# Patient Record
Sex: FEMALE | Race: WHITE | NOT HISPANIC OR LATINO | URBAN - METROPOLITAN AREA
[De-identification: names, ages, dates, MRNs, and addresses within clinical notes are randomized per-mention and may not be internally consistent; named-entity substitution may affect disease eponyms.]

---

## 2023-03-31 ENCOUNTER — TELEPHONE (OUTPATIENT)
Dept: PRIMARY CARE | Facility: CLINIC | Age: 58
End: 2023-03-31
Payer: COMMERCIAL

## 2023-03-31 DIAGNOSIS — N30.00 ACUTE CYSTITIS WITHOUT HEMATURIA: Primary | ICD-10-CM

## 2023-03-31 RX ORDER — CIPROFLOXACIN 250 MG/1
250 TABLET, FILM COATED ORAL 2 TIMES DAILY
Qty: 10 TABLET | Refills: 0 | Status: SHIPPED | OUTPATIENT
Start: 2023-03-31 | End: 2023-04-05

## 2023-04-13 LAB
CHOLESTEROL (MG/DL) IN SER/PLAS: 161 MG/DL (ref 0–199)
CHOLESTEROL IN HDL (MG/DL) IN SER/PLAS: 61.4 MG/DL
CHOLESTEROL/HDL RATIO: 2.6
LDL: 71 MG/DL (ref 0–99)
TRIGLYCERIDE (MG/DL) IN SER/PLAS: 143 MG/DL (ref 0–149)
VLDL: 29 MG/DL (ref 0–40)

## 2023-04-25 ENCOUNTER — TELEPHONE (OUTPATIENT)
Dept: PRIMARY CARE | Facility: CLINIC | Age: 58
End: 2023-04-25
Payer: COMMERCIAL

## 2023-04-25 NOTE — TELEPHONE ENCOUNTER
Pt. Would like to know BW for lipid results and wants to know if she should continue taking her meds. That were prescribed.

## 2023-07-12 ENCOUNTER — TELEPHONE (OUTPATIENT)
Dept: PRIMARY CARE | Facility: CLINIC | Age: 58
End: 2023-07-12
Payer: COMMERCIAL

## 2023-07-12 NOTE — TELEPHONE ENCOUNTER
Patient calling for a referral for a herniated intestine its not bothering her daily and painful,    Also would like the results from her last blood work she never received them. Please advise

## 2023-07-13 ENCOUNTER — TELEPHONE (OUTPATIENT)
Dept: PRIMARY CARE | Facility: CLINIC | Age: 58
End: 2023-07-13
Payer: COMMERCIAL

## 2023-07-20 NOTE — TELEPHONE ENCOUNTER
Talked with pt who is seeing Dr Sherman tomorrow for hernia repair. I informed her of her lipid panel results (WNL) and she has an appt for Oct 18th for her physical (no appts avaiablein Sept).

## 2023-08-01 LAB
HEMATOCRIT (%) IN BLOOD BY AUTOMATED COUNT: 38.5 % (ref 36–46)
HEMOGLOBIN (G/DL) IN BLOOD: 12.6 G/DL (ref 12–16)

## 2023-08-03 ENCOUNTER — HOSPITAL ENCOUNTER (OUTPATIENT)
Dept: DATA CONVERSION | Facility: HOSPITAL | Age: 58
End: 2023-08-03
Attending: SURGERY | Admitting: SURGERY
Payer: COMMERCIAL

## 2023-08-03 DIAGNOSIS — K43.9 VENTRAL HERNIA WITHOUT OBSTRUCTION OR GANGRENE: ICD-10-CM

## 2023-08-07 LAB
ATRIAL RATE: 70 BPM
P AXIS: 43 DEGREES
P OFFSET: 201 MS
P ONSET: 163 MS
PR INTERVAL: 112 MS
Q ONSET: 219 MS
QRS COUNT: 12 BEATS
QRS DURATION: 74 MS
QT INTERVAL: 432 MS
QTC CALCULATION(BAZETT): 466 MS
QTC FREDERICIA: 455 MS
R AXIS: 47 DEGREES
T AXIS: 47 DEGREES
T OFFSET: 435 MS
VENTRICULAR RATE: 70 BPM

## 2023-08-08 ENCOUNTER — TELEMEDICINE (OUTPATIENT)
Dept: PRIMARY CARE | Facility: CLINIC | Age: 58
End: 2023-08-08
Payer: COMMERCIAL

## 2023-08-08 VITALS — WEIGHT: 162 LBS | HEIGHT: 64 IN | BODY MASS INDEX: 27.66 KG/M2

## 2023-08-08 DIAGNOSIS — Z87.19 H/O HERNIA REPAIR: ICD-10-CM

## 2023-08-08 DIAGNOSIS — Z98.890 H/O HERNIA REPAIR: ICD-10-CM

## 2023-08-08 DIAGNOSIS — N39.0 ACUTE UTI: Primary | ICD-10-CM

## 2023-08-08 PROBLEM — J30.2 SEASONAL ALLERGIES: Status: ACTIVE | Noted: 2023-08-08

## 2023-08-08 PROBLEM — K42.9 UMBILICAL HERNIA: Status: ACTIVE | Noted: 2023-08-08

## 2023-08-08 PROBLEM — K21.9 GERD (GASTROESOPHAGEAL REFLUX DISEASE): Status: ACTIVE | Noted: 2023-08-08

## 2023-08-08 PROBLEM — E66.9 OBESITY: Status: ACTIVE | Noted: 2023-08-08

## 2023-08-08 PROBLEM — K58.9 IBS (IRRITABLE BOWEL SYNDROME): Status: ACTIVE | Noted: 2023-08-08

## 2023-08-08 PROBLEM — E78.5 HYPERLIPIDEMIA: Status: ACTIVE | Noted: 2023-08-08

## 2023-08-08 PROBLEM — G47.33 OBSTRUCTIVE SLEEP APNEA, ADULT: Status: ACTIVE | Noted: 2023-08-08

## 2023-08-08 PROBLEM — F41.9 ANXIETY: Status: ACTIVE | Noted: 2023-08-08

## 2023-08-08 PROCEDURE — 99213 OFFICE O/P EST LOW 20 MIN: CPT | Performed by: STUDENT IN AN ORGANIZED HEALTH CARE EDUCATION/TRAINING PROGRAM

## 2023-08-08 RX ORDER — CITALOPRAM 40 MG/1
40 TABLET, FILM COATED ORAL
Qty: 90 TABLET | Refills: 2 | Status: CANCELLED | OUTPATIENT
Start: 2023-08-08

## 2023-08-08 RX ORDER — GABAPENTIN 300 MG/1
CAPSULE ORAL
COMMUNITY
Start: 2023-08-03 | End: 2023-10-18 | Stop reason: ALTCHOICE

## 2023-08-08 RX ORDER — NITROFURANTOIN 25; 75 MG/1; MG/1
100 CAPSULE ORAL 2 TIMES DAILY
Qty: 14 CAPSULE | Refills: 0 | Status: SHIPPED | OUTPATIENT
Start: 2023-08-08 | End: 2023-08-15

## 2023-08-08 RX ORDER — ATORVASTATIN CALCIUM 10 MG/1
10 TABLET, FILM COATED ORAL NIGHTLY
COMMUNITY
End: 2023-10-18 | Stop reason: ALTCHOICE

## 2023-08-08 RX ORDER — PHENTERMINE HYDROCHLORIDE 37.5 MG/1
37.5 CAPSULE ORAL DAILY
COMMUNITY
Start: 2023-06-14 | End: 2023-10-18 | Stop reason: ALTCHOICE

## 2023-08-08 RX ORDER — CITALOPRAM 40 MG/1
40 TABLET, FILM COATED ORAL
COMMUNITY
End: 2023-10-18 | Stop reason: SDUPTHER

## 2023-08-08 NOTE — PROGRESS NOTES
"Subjective   Patient ID: Janette Mills is a 58 y.o. female who presents for the following    Assessment/Plan   #Acute Uncomplicated Cystitis   #Recent Hernia Repair Surgery (DOS 8/3/2023); recovering    PLAN  - Macrobid 100mg BID x 7 days   - As patient symptoms are consistent with UTI, no need for UA or Ucx at this time.   - Patient advised to watch for new fevers, chills, worsening abdominal pain, and flank pain. If these occur patient is to either call office or go to ED for further care .  - Oral hydration encouraged    Pt to follow up with PCP for primary care at next scheduled appointment    HPI    58F presents for virtual visit. Attempt was made to connect with video conferencing software, but due to technical difficulties, patient was unable to connect. Visit will be completed via phone call.     She is currently complaining of increased urinary frequency and dysuria. She has mild suprapubic pain that is 3/10 in severity and non-radiating. Denies any lightheadedness, dizziness, flank pain, fevers, chills, nausea, vomiting, diarrhea, vaginal discharge/bleeding or new numbness/weakness/tingling. Patient had a intestinal hernia repair on August 3, 2023 and she is recovering well at this time. Currently she is taking tylenol and ibuprofen for pain relief.     Post-Menopausal    Social  T: denies  A: denies  D: denies         Visit Vitals  Ht 1.626 m (5' 4\")   Wt 73.5 kg (162 lb)   BMI 27.81 kg/m²   Smoking Status Never   BSA 1.82 m²     PHYSICAL EXAM     Exam not performed due to virtual visit.   REVIEW OF SYSTEMS     ROS reviewed in HPI  No Known Allergies    Current Outpatient Medications   Medication Sig Dispense Refill    citalopram (CeleXA) 40 mg tablet Take 1 tablet (40 mg) by mouth once daily.      phentermine 37.5 mg capsule Take 1 capsule (37.5 mg) by mouth once daily.       No current facility-administered medications for this visit.       Objective     Orders Only on 08/01/2023   Component Date " Value Ref Range Status    Hemoglobin 08/01/2023 12.6  12.0 - 16.0 g/dL Final    Hematocrit 08/01/2023 38.5  36.0 - 46.0 % Final   Orders Only on 04/13/2023   Component Date Value Ref Range Status    Cholesterol 04/13/2023 161  0 - 199 mg/dL Final    HDL 04/13/2023 61.4  mg/dL Final    Cholesterol/HDL Ratio 04/13/2023 2.6   Final    LDL 04/13/2023 71  0 - 99 mg/dL Final    VLDL 04/13/2023 29  0 - 40 mg/dL Final    Triglycerides 04/13/2023 143  0 - 149 mg/dL Final       Radiology: Reviewed imaging in powerchart.  Electrocardiogram 12 Lead    Result Date: 8/7/2023  Normal sinus rhythm Low voltage QRS, consider pulmonary disease, pericardial effusion, or normal variant Borderline ECG No previous ECGs available Confirmed by Zeb Morales ([id number]) on 8/7/2023 9:44:52 AM      No family history on file.  Social History     Socioeconomic History    Marital status: Unknown     Spouse name: None    Number of children: None    Years of education: None    Highest education level: None   Occupational History    None   Tobacco Use    Smoking status: Never    Smokeless tobacco: Never   Substance and Sexual Activity    Alcohol use: Yes    Drug use: None    Sexual activity: None   Other Topics Concern    None   Social History Narrative    None     Social Determinants of Health     Financial Resource Strain: Not on file   Food Insecurity: Not on file   Transportation Needs: Not on file   Physical Activity: Not on file   Stress: Not on file   Social Connections: Not on file   Intimate Partner Violence: Not on file   Housing Stability: Not on file     Past Medical History:   Diagnosis Date    Personal history of other diseases of the nervous system and sense organs     History of earache     Past Surgical History:   Procedure Laterality Date    CHOLECYSTECTOMY  02/07/2018    Cholecystectomy       Charting was completed using voice recognition technology and may include unintended errors.

## 2023-08-14 LAB
COMPLETE PATHOLOGY REPORT: NORMAL
CONVERTED CLINICAL DIAGNOSIS-HISTORY: NORMAL
CONVERTED FINAL DIAGNOSIS: NORMAL
CONVERTED FINAL REPORT PDF LINK TO COPY AND PASTE: NORMAL
CONVERTED GROSS DESCRIPTION: NORMAL

## 2023-09-29 VITALS — WEIGHT: 164.24 LBS | BODY MASS INDEX: 28.04 KG/M2 | HEIGHT: 64 IN

## 2023-09-30 NOTE — H&P
"    History & Physical Reviewed:   Pregnant/Lactating:  ·  Are You Pregnant no (1)   ·  Are You Currently Breastfeeding no (1)     I have reviewed the History and Physical dated:  21-Jul-2023   History and Physical reviewed and relevant findings noted. Patient examined to review pertinent physical  findings.: No significant changes   Home Medications Reviewed: no changes noted   Allergies Reviewed: no changes noted       ERAS (Enhanced Recovery After Surgery):  ·  ERAS Patient: no     Consent:   COVID-19 Consent:  ·  COVID-19 Risk Consent Surgeon has reviewed key risks related to the risk of aranza COVID-19 and if they contract COVID-19 what the risks are.       Electronic Signatures:  Rai Sherman)  (Signed 03-Aug-2023 12:26)   Authored: History & Physical Reviewed, ERAS, Consent,  Note Completion      Last Updated: 03-Aug-2023 12:26 by Rai Sherman (MD)    References:  1.  Data Referenced From \"Patient Profile - Preop v3\" 02-Aug-2023 11:33   "

## 2023-10-01 NOTE — OP NOTE
Post Operative Note:     Post-Procedure Diagnosis: Anterior abdominal wall  hernia   Procedure: 1.  Abdominal wall herniorrhaphy  2.   3.   4.   5.   Surgeon: Dr. Sherman   Resident/Fellow/Other Assistant: None   Estimated Blood Loss (mL): none   Specimen: yes   Findings: As above     Attestation:   Note Completion:  Attending Attestation I performed the procedure without a resident         Electronic Signatures:  Rai Sherman)  (Signed 03-Aug-2023 13:15)   Authored: Post Operative Note, Note Completion      Last Updated: 03-Aug-2023 13:15 by Rai Sherman)

## 2023-10-18 ENCOUNTER — LAB (OUTPATIENT)
Dept: LAB | Facility: LAB | Age: 58
End: 2023-10-18
Payer: COMMERCIAL

## 2023-10-18 ENCOUNTER — OFFICE VISIT (OUTPATIENT)
Dept: PRIMARY CARE | Facility: CLINIC | Age: 58
End: 2023-10-18
Payer: COMMERCIAL

## 2023-10-18 VITALS
WEIGHT: 166.4 LBS | BODY MASS INDEX: 28.41 KG/M2 | HEART RATE: 70 BPM | HEIGHT: 64 IN | OXYGEN SATURATION: 98 % | TEMPERATURE: 96.6 F | SYSTOLIC BLOOD PRESSURE: 126 MMHG | DIASTOLIC BLOOD PRESSURE: 88 MMHG

## 2023-10-18 DIAGNOSIS — Z00.00 ANNUAL PHYSICAL EXAM: Primary | ICD-10-CM

## 2023-10-18 DIAGNOSIS — F41.9 ANXIETY: ICD-10-CM

## 2023-10-18 DIAGNOSIS — Z00.00 ANNUAL PHYSICAL EXAM: ICD-10-CM

## 2023-10-18 LAB
25(OH)D3 SERPL-MCNC: 35 NG/ML (ref 30–100)
ALBUMIN SERPL BCP-MCNC: 4.5 G/DL (ref 3.4–5)
ALP SERPL-CCNC: 69 U/L (ref 33–110)
ALT SERPL W P-5'-P-CCNC: 12 U/L (ref 7–45)
ANION GAP SERPL CALC-SCNC: 14 MMOL/L (ref 10–20)
AST SERPL W P-5'-P-CCNC: 14 U/L (ref 9–39)
BILIRUB SERPL-MCNC: 0.9 MG/DL (ref 0–1.2)
BUN SERPL-MCNC: 8 MG/DL (ref 6–23)
CALCIUM SERPL-MCNC: 9.8 MG/DL (ref 8.6–10.6)
CHLORIDE SERPL-SCNC: 102 MMOL/L (ref 98–107)
CHOLEST SERPL-MCNC: 253 MG/DL (ref 0–199)
CHOLESTEROL/HDL RATIO: 4.2
CO2 SERPL-SCNC: 30 MMOL/L (ref 21–32)
CREAT SERPL-MCNC: 0.63 MG/DL (ref 0.5–1.05)
ERYTHROCYTE [DISTWIDTH] IN BLOOD BY AUTOMATED COUNT: 12.7 % (ref 11.5–14.5)
EST. AVERAGE GLUCOSE BLD GHB EST-MCNC: 108 MG/DL
GFR SERPL CREATININE-BSD FRML MDRD: >90 ML/MIN/1.73M*2
GLUCOSE SERPL-MCNC: 90 MG/DL (ref 74–99)
HBA1C MFR BLD: 5.4 %
HCT VFR BLD AUTO: 44.2 % (ref 36–46)
HDLC SERPL-MCNC: 60 MG/DL
HGB BLD-MCNC: 14 G/DL (ref 12–16)
LDLC SERPL CALC-MCNC: 145 MG/DL
MCH RBC QN AUTO: 30.7 PG (ref 26–34)
MCHC RBC AUTO-ENTMCNC: 31.7 G/DL (ref 32–36)
MCV RBC AUTO: 97 FL (ref 80–100)
NON HDL CHOLESTEROL: 193 MG/DL (ref 0–149)
NRBC BLD-RTO: 0 /100 WBCS (ref 0–0)
PLATELET # BLD AUTO: 295 X10*3/UL (ref 150–450)
PMV BLD AUTO: 10.4 FL (ref 7.5–11.5)
POTASSIUM SERPL-SCNC: 4.1 MMOL/L (ref 3.5–5.3)
PROT SERPL-MCNC: 7.1 G/DL (ref 6.4–8.2)
RBC # BLD AUTO: 4.56 X10*6/UL (ref 4–5.2)
SODIUM SERPL-SCNC: 142 MMOL/L (ref 136–145)
TRIGL SERPL-MCNC: 239 MG/DL (ref 0–149)
TSH SERPL-ACNC: 1.44 MIU/L (ref 0.44–3.98)
VLDL: 48 MG/DL (ref 0–40)
WBC # BLD AUTO: 5 X10*3/UL (ref 4.4–11.3)

## 2023-10-18 PROCEDURE — 84443 ASSAY THYROID STIM HORMONE: CPT

## 2023-10-18 PROCEDURE — 36415 COLL VENOUS BLD VENIPUNCTURE: CPT

## 2023-10-18 PROCEDURE — 99396 PREV VISIT EST AGE 40-64: CPT | Performed by: INTERNAL MEDICINE

## 2023-10-18 PROCEDURE — 80061 LIPID PANEL: CPT

## 2023-10-18 PROCEDURE — 85027 COMPLETE CBC AUTOMATED: CPT

## 2023-10-18 PROCEDURE — 82306 VITAMIN D 25 HYDROXY: CPT

## 2023-10-18 PROCEDURE — 83036 HEMOGLOBIN GLYCOSYLATED A1C: CPT

## 2023-10-18 PROCEDURE — 80053 COMPREHEN METABOLIC PANEL: CPT

## 2023-10-18 PROCEDURE — 1036F TOBACCO NON-USER: CPT | Performed by: INTERNAL MEDICINE

## 2023-10-18 PROCEDURE — 93000 ELECTROCARDIOGRAM COMPLETE: CPT | Performed by: INTERNAL MEDICINE

## 2023-10-18 RX ORDER — CITALOPRAM 40 MG/1
40 TABLET, FILM COATED ORAL
Qty: 90 TABLET | Refills: 3 | Status: SHIPPED | OUTPATIENT
Start: 2023-10-18

## 2023-10-18 ASSESSMENT — VISUAL ACUITY
OS_CC: 20/20
OD_CC: 20/15

## 2023-10-18 NOTE — LETTER
October 18, 2023     Patient: Janette Mills   YOB: 1965   Date of Visit: 10/18/2023       To Whom It May Concern:    Janette Mills was seen in my clinic on 10/18/2023 at 8:15 am for a Wellness exam.    If you have any questions or concerns, please don't hesitate to call.         Sincerely,         Dennis Osborne, DO

## 2023-10-18 NOTE — PROGRESS NOTES
"Subjective   Patient ID: Janette Mills is a 58 y.o. female who presents for the following    Assessment/Plan    Colorguard,  8/28/2023 negative      Anxiety: Refill citalopram     CBC, CMP, lipid panel, a1c, tsh, vitamin d     follow up with gyn      mammogram order given     HPI  Female obstructive sleep apnea comes today for physical exam      anxiety controlled on citalopram. she says that her  causes her a lot of anxiety as he has a significant drinking problem. patient's  continues to sleep and drink ETOH. She did leave her house and  as of December 26, 2023 and lives in her own apartment . She is still trying to get a divorce. Very difficult time currently. She has adult children.      denies smoking  occasional alcohol   denies illegal drugs     denies any family medical problems      surgical history: umbilical hernia 9/2023 Dr Sherman     patient goes to GYN and gets regular gyn and mammogram orders filled.   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        Visit Vitals  /88 (BP Location: Right arm, Patient Position: Sitting)   Pulse 70   Temp 35.9 °C (96.6 °F)   Ht 1.626 m (5' 4\")   Wt 75.5 kg (166 lb 6.4 oz)   SpO2 98%   BMI 28.56 kg/m²   Smoking Status Never   BSA 1.85 m²     PHYSICAL EXAM   General appearance: Alert and in no acute distress. speech is clear and coherent  HEENT: Sclera and conjunctiva white, EOMI, uvela midline, no mouth lesions. PERRLA,  nasal turbinates are not swollen without exudate. TM's Wagoner with cone of light, external ear canal with scant cerumen. No head trauma  Neck: no carotid bruits or thyromegaly. no lymphadenopathy   Respiratory : No respiratory distress, normal respiratory rhythm and effort. Clear bilateral breath sounds. No wheezing or rhonchi.   Cardiovascular: heart rate regular, S1, S2. no murmurs. no Lower extremity edema  Skin inspection: Normal skin color and pigmentation, normal " skin turgor and no visible rash, induration, or cellulitis  MSK: 5/5 strength upper and lower extremities without gait abnormalities. no loss of muscle mass   Neuro: 2-12 CN grossly intact.  no slurred speech. no lateralizing deficit  Psychiatric Orientation: Oriented to person, place, and time. no depression, homicidal or suicidal thoughts, normal affect  Abdomen: soft, none tender, none distended. no organomegaly      REVIEW OF SYSTEMS     Constitutional: not feeling tired and no fever, chills or sweats. Denies weight loss    HEENT: no earache and no sore throat. no blurred vision and or double vision. no headache  Cardiovascular: no exertional chest pain, no palpitations, no lower extremity edema and no intermittent leg claudication.   Lungs: Denies shortness of breath, exertional dyspnea, wheezing  Gastrointestinal: no change in bowel habits, no diarrhea, no nausea, no vomiting and no abdominal pain. Denies Melena, brbpr or dark stool  Musculoskeletal: no myalgias, no muscle weakness and no limb swelling.   Skin: no rashes, no change in skin color and pigmentation and no skin lumps.   Neurological: no headaches, no seizures, no numbness, no lateralizing deficits and no fainting.   Psychiatric: no depression and no anxiety.   Urine: denies polyuria, hematuria, dysuria   Endocrine: no cold intolerance, no heat intolerance    No Known Allergies    Current Outpatient Medications   Medication Sig Dispense Refill    citalopram (CeleXA) 40 mg tablet Take 1 tablet (40 mg) by mouth once daily.       No current facility-administered medications for this visit.       Objective     Orders Only on 08/01/2023   Component Date Value Ref Range Status    Hemoglobin 08/01/2023 12.6  12.0 - 16.0 g/dL Final    Hematocrit 08/01/2023 38.5  36.0 - 46.0 % Final       Radiology: Reviewed imaging in powerchart.  No results found.    No family history on file.  Social History     Socioeconomic History    Marital status: Unknown     Spouse  name: Not on file    Number of children: Not on file    Years of education: Not on file    Highest education level: Not on file   Occupational History    Not on file   Tobacco Use    Smoking status: Never    Smokeless tobacco: Never   Substance and Sexual Activity    Alcohol use: Yes     Comment: occassionally    Drug use: Not on file    Sexual activity: Not on file   Other Topics Concern    Not on file   Social History Narrative    Not on file     Social Determinants of Health     Financial Resource Strain: Not on file   Food Insecurity: Not on file   Transportation Needs: Not on file   Physical Activity: Not on file   Stress: Not on file   Social Connections: Not on file   Intimate Partner Violence: Not on file   Housing Stability: Not on file     Past Medical History:   Diagnosis Date    Personal history of other diseases of the nervous system and sense organs     History of earache     Past Surgical History:   Procedure Laterality Date    CHOLECYSTECTOMY  02/07/2018    Cholecystectomy       Charting was completed using voice recognition technology and may include unintended errors.

## 2023-10-20 ENCOUNTER — TELEPHONE (OUTPATIENT)
Dept: PRIMARY CARE | Facility: CLINIC | Age: 58
End: 2023-10-20
Payer: COMMERCIAL

## 2023-10-20 DIAGNOSIS — E78.5 HYPERLIPIDEMIA, UNSPECIFIED HYPERLIPIDEMIA TYPE: Primary | ICD-10-CM

## 2023-10-20 DIAGNOSIS — E78.5 HYPERLIPIDEMIA, UNSPECIFIED HYPERLIPIDEMIA TYPE: ICD-10-CM

## 2023-10-20 RX ORDER — ROSUVASTATIN CALCIUM 5 MG/1
5 TABLET, COATED ORAL DAILY
Qty: 90 TABLET | Refills: 1 | Status: SHIPPED | OUTPATIENT
Start: 2023-10-20 | End: 2023-10-20 | Stop reason: SDUPTHER

## 2023-10-20 RX ORDER — ROSUVASTATIN CALCIUM 5 MG/1
5 TABLET, COATED ORAL DAILY
Qty: 90 TABLET | Refills: 1 | Status: SHIPPED | OUTPATIENT
Start: 2023-10-20 | End: 2024-05-01

## 2023-10-20 NOTE — TELEPHONE ENCOUNTER
Pt called to switch pharmacies and educated on Crestor/labs-she is taking in mostly vegetarian diet-educated that it could be genetics and she agreed to take the medication

## 2023-10-20 NOTE — TELEPHONE ENCOUNTER
----- Message from Dennis Osborne DO sent at 10/20/2023 11:26 AM EDT -----  LDL could use some reduction as this is your bad cholesterol. I did call in crestor for patient, unless she would like to do this on her own and eat more vegetable based diet. Follow up in 3-4 months and fast at that time.

## 2023-12-15 ENCOUNTER — TELEPHONE (OUTPATIENT)
Dept: PRIMARY CARE | Facility: CLINIC | Age: 58
End: 2023-12-15
Payer: COMMERCIAL

## 2023-12-18 DIAGNOSIS — N30.00 ACUTE CYSTITIS WITHOUT HEMATURIA: Primary | ICD-10-CM

## 2023-12-18 RX ORDER — NITROFURANTOIN (MACROCRYSTALS) 100 MG/1
100 CAPSULE ORAL 2 TIMES DAILY
Qty: 10 CAPSULE | Refills: 0 | Status: SHIPPED | OUTPATIENT
Start: 2023-12-18 | End: 2023-12-28

## 2024-01-03 ENCOUNTER — TELEMEDICINE (OUTPATIENT)
Dept: PRIMARY CARE | Facility: CLINIC | Age: 59
End: 2024-01-03
Payer: COMMERCIAL

## 2024-01-03 VITALS — WEIGHT: 160 LBS | BODY MASS INDEX: 27.46 KG/M2

## 2024-01-03 DIAGNOSIS — A49.9 BACTERIAL INFECTION: Primary | ICD-10-CM

## 2024-01-03 PROCEDURE — 99213 OFFICE O/P EST LOW 20 MIN: CPT | Performed by: EMERGENCY MEDICINE

## 2024-01-03 RX ORDER — CIPROFLOXACIN 500 MG/1
500 TABLET ORAL 2 TIMES DAILY
Qty: 20 TABLET | Refills: 0 | Status: SHIPPED | OUTPATIENT
Start: 2024-01-03 | End: 2024-01-13

## 2024-01-03 NOTE — PROGRESS NOTES
Subjective   Patient ID: Janette Mills is a 58 y.o. female who presents for No chief complaint on file..    Assessment/Plan   Problem List Items Addressed This Visit    None  Visit Diagnoses       Bacterial infection    -  Primary    Relevant Medications    ciprofloxacin (Cipro) 500 mg tablet          Dysuria- Check UA and culture. Will treat empirically with cipro.     Anxiety- continue celexa 40mg     Hyperlipidemia- crestor 5mg     Follow up as needed     Source of history: Nurse, Medical personnel, Medical record, Patient.  History limitation: None.    HPI  58 y.o. female here for virtual visit     This visit was completed virtually due to the restrictions of the COVID-19 pandemic. All issues as below were discussed and addressed but no physical exam was performed. If it was felt that the patient should be evaluated in clinic or ER, then they were directed there. The patient verbally consented to visit      Presents with pain and burning with urination and increased urinary frequency. About 2 weeks ago was treated with 5 days of  nitrofurantoin, symptoms quickly returned.     No Known Allergies    Current Outpatient Medications   Medication Sig Dispense Refill    ciprofloxacin (Cipro) 500 mg tablet Take 1 tablet (500 mg) by mouth 2 times a day for 10 days. 20 tablet 0    citalopram (CeleXA) 40 mg tablet Take 1 tablet (40 mg) by mouth once daily. 90 tablet 3    rosuvastatin (Crestor) 5 mg tablet Take 1 tablet (5 mg) by mouth once daily. 90 tablet 1     No current facility-administered medications for this visit.       Objective   Visit Vitals  Wt 72.6 kg (160 lb)   BMI 27.46 kg/m²   Smoking Status Never   BSA 1.81 m²     Physical Exam  Patient was not physically examined.     Review of Systems   Comprehensive review of systems as allowed by patient condition and nursing input is negative    Lab on 10/18/2023   Component Date Value Ref Range Status    WBC 10/18/2023 5.0  4.4 - 11.3 x10*3/uL Final    nRBC  10/18/2023 0.0  0.0 - 0.0 /100 WBCs Final    RBC 10/18/2023 4.56  4.00 - 5.20 x10*6/uL Final    Hemoglobin 10/18/2023 14.0  12.0 - 16.0 g/dL Final    Hematocrit 10/18/2023 44.2  36.0 - 46.0 % Final    MCV 10/18/2023 97  80 - 100 fL Final    MCH 10/18/2023 30.7  26.0 - 34.0 pg Final    MCHC 10/18/2023 31.7 (L)  32.0 - 36.0 g/dL Final    RDW 10/18/2023 12.7  11.5 - 14.5 % Final    Platelets 10/18/2023 295  150 - 450 x10*3/uL Final    MPV 10/18/2023 10.4  7.5 - 11.5 fL Final    Glucose 10/18/2023 90  74 - 99 mg/dL Final    Sodium 10/18/2023 142  136 - 145 mmol/L Final    Potassium 10/18/2023 4.1  3.5 - 5.3 mmol/L Final    Chloride 10/18/2023 102  98 - 107 mmol/L Final    Bicarbonate 10/18/2023 30  21 - 32 mmol/L Final    Anion Gap 10/18/2023 14  10 - 20 mmol/L Final    Urea Nitrogen 10/18/2023 8  6 - 23 mg/dL Final    Creatinine 10/18/2023 0.63  0.50 - 1.05 mg/dL Final    eGFR 10/18/2023 >90  >60 mL/min/1.73m*2 Final    Calcium 10/18/2023 9.8  8.6 - 10.6 mg/dL Final    Albumin 10/18/2023 4.5  3.4 - 5.0 g/dL Final    Alkaline Phosphatase 10/18/2023 69  33 - 110 U/L Final    Total Protein 10/18/2023 7.1  6.4 - 8.2 g/dL Final    AST 10/18/2023 14  9 - 39 U/L Final    Bilirubin, Total 10/18/2023 0.9  0.0 - 1.2 mg/dL Final    ALT 10/18/2023 12  7 - 45 U/L Final    Hemoglobin A1C 10/18/2023 5.4  see below % Final    Estimated Average Glucose 10/18/2023 108  Not Established mg/dL Final    Cholesterol 10/18/2023 253 (H)  0 - 199 mg/dL Final    HDL-Cholesterol 10/18/2023 60.0  mg/dL Final    Cholesterol/HDL Ratio 10/18/2023 4.2   Final    LDL Calculated 10/18/2023 145 (H)  <=99 mg/dL Final    VLDL 10/18/2023 48 (H)  0 - 40 mg/dL Final    Triglycerides 10/18/2023 239 (H)  0 - 149 mg/dL Final    Non HDL Cholesterol 10/18/2023 193 (H)  0 - 149 mg/dL Final    Thyroid Stimulating Hormone 10/18/2023 1.44  0.44 - 3.98 mIU/L Final    Vitamin D, 25-Hydroxy, Total 10/18/2023 35  30 - 100 ng/mL Final       Radiology: Reviewed imaging in  powerchart.  BI mammo bilateral screening tomosynthesis    Result Date: 12/1/2023  SCREENING BILATERAL DIGITAL MAMMOGRAM WITH TOMOSYNTHESIS. Clinical Data: Screening. Comparison: 10/5/2022, 10/6/2020, 8/24/2020, 6/8/2019, 1/6/2018, 7/7/2016, 6/26/2014, 6/23/2014. Mammographic findings: Standard 2D and tomosynthesis images were reviewed at 1 mm slice thickness. The breasts are heterogeneously dense, which may obscure small masses. No suspicious masses or suspicious calcifications are identified in either breast. There are stable benign-appearing grouped calcifications in the upper-outer quadrant of the left breast at mid depth. The digital mammogram has been reviewed with the aid of CAD. IMPRESSION AND RECOMMENDATION: No mammographic evidence of malignancy. Recommendation is for the patient to return in one year for annual mammogram. Category 2: Benign finding YOUR MAMMOGRAM DEMONSTRATES THAT YOU HAVE DENSE BREAST TISSUE, WHICH COULD HIDE ABNORMALITIES. DENSE BREAST TISSUE, IN AND OF ITSELF, IS A RELATIVELY COMMON CONDITION. THEREFORE, THIS INFORMATION IS NOT PROVIDED TO CAUSE UNDUE CONCERN; RATHER, IT IS TO RAISE YOUR AWARENESS AND PROMOTE DISCUSSION WITH YOUR HEALTHCARE PROVIDER REGARDING THE PRESENCE OF DENSE BREAST TISSUE IN ADDITION TO OTHER RISK FACTORS. Electronically signed by:  Lavelle Cowan MD  12/13/2023 10:50 AM EST Workstation: [id number] Technologist:  MS Dictated By:   LAVELLE COWAN MD Signed By:     LAVELLE COWAN MD Signed Out:    12/13/23 10:54:30      No family history on file.  Social History     Socioeconomic History    Marital status: Unknown     Spouse name: Not on file    Number of children: Not on file    Years of education: Not on file    Highest education level: Not on file   Occupational History    Not on file   Tobacco Use    Smoking status: Never    Smokeless tobacco: Never   Substance and Sexual Activity    Alcohol use: Yes     Comment: occassionally    Drug use: Not on file    Sexual  activity: Not on file   Other Topics Concern    Not on file   Social History Narrative    Not on file     Social Determinants of Health     Financial Resource Strain: Not on file   Food Insecurity: Not on file   Transportation Needs: Not on file   Physical Activity: Not on file   Stress: Not on file   Social Connections: Not on file   Intimate Partner Violence: Not on file   Housing Stability: Not on file     Past Medical History:   Diagnosis Date    Personal history of other diseases of the nervous system and sense organs     History of earache     Past Surgical History:   Procedure Laterality Date    CHOLECYSTECTOMY  02/07/2018    Cholecystectomy       Scribe Attestation  By signing my name below, IShiela Scribe   attest that this documentation has been prepared under the direction and in the presence of Sae Glasgow MD.

## 2024-05-01 DIAGNOSIS — E78.5 HYPERLIPIDEMIA, UNSPECIFIED HYPERLIPIDEMIA TYPE: ICD-10-CM

## 2024-05-01 RX ORDER — ROSUVASTATIN CALCIUM 5 MG/1
5 TABLET, COATED ORAL DAILY
Qty: 90 TABLET | Refills: 3 | Status: SHIPPED | OUTPATIENT
Start: 2024-05-01

## 2024-05-06 NOTE — OP NOTE
SURGEON:  Rai Sherman MD    PREOPERATIVE DIAGNOSIS:    Anterior abdominal wall hernia.    POSTOPERATIVE DIAGNOSIS:    Anterior abdominal wall hernia.    PROCEDURE:    Repair of anterior abdominal wall hernia.    ANESTHESIA:    MAC.    INDICATIONS:    The patient is a 58-year-old female with a symptomatic supraumbilical  midline abdominal wall hernia.     DESCRIPTION OF PROCEDURE:    Following informed consent, the patient was taken to the operating  room, placed in supine position.  Huddle was performed.  She was  given a MAC anesthetic.  Sequential compression devices were in place  and functioning.  She received Ancef IV in the operating room.  Her  abdomen was prepped and draped in sterile fashion.  A time-out was  performed.  A supraumbilical midline skin incision was made.  Approximately 5 cm superior to the umbilicus in the midline, the  patient had a hernia.  The hernia sac was freed from surrounding  tissue and excised.  Fatty tissue from within the hernia sac was  excised, which appeared to be preperitoneal fat.  The hernia sac was  closed with a running 3-0 Vicryl suture.  The fascial defect was  approximately 2 to 2.5 cm in width, but only about 0.5 cm in length.  The fascia was closed in a transverse direction with interrupted 0  Prolene sutures.  There was no tension on the closure.  Mesh was not  placed.  Wound was irrigated with saline.  0.5% plain Marcaine was  infiltrated.  The wound was closed in layers with interrupted 3-0  Vicryl subcutaneous sutures and a running 4-0 Vicryl subcuticular  skin stitch.  A dressing was placed.  The patient taken to the  recovery room in satisfactory condition, having tolerated procedure  well.  Counts were correct.       Rai Sherman MD    DD:  08/03/2023 13:13:50 EST  DT:  08/03/2023 23:20:39 EST  DICTATION NUMBER:  [id number]  INTERNAL JOB NUMBER:  [id number]      cc:             Dennis Osborne DO          Electronic Signatures:  Rai Sherman  (MD) (Signed on 04-Aug-2023 07:18)   Authored  Unsigned, Draft (SYS GENERATED) (Entered on 03-Aug-2023 23:20)   Entered    Last Updated: 04-Aug-2023 07:18 by Rai Sherman)

## 2024-11-18 ENCOUNTER — LAB (OUTPATIENT)
Dept: LAB | Facility: LAB | Age: 59
End: 2024-11-18
Payer: COMMERCIAL

## 2024-11-18 ENCOUNTER — APPOINTMENT (OUTPATIENT)
Dept: PRIMARY CARE | Facility: CLINIC | Age: 59
End: 2024-11-18
Payer: COMMERCIAL

## 2024-11-18 VITALS
BODY MASS INDEX: 30.05 KG/M2 | HEIGHT: 64 IN | DIASTOLIC BLOOD PRESSURE: 70 MMHG | WEIGHT: 176 LBS | SYSTOLIC BLOOD PRESSURE: 128 MMHG | OXYGEN SATURATION: 98 % | HEART RATE: 67 BPM

## 2024-11-18 DIAGNOSIS — F41.9 ANXIETY: ICD-10-CM

## 2024-11-18 DIAGNOSIS — E78.5 HYPERLIPIDEMIA, UNSPECIFIED HYPERLIPIDEMIA TYPE: ICD-10-CM

## 2024-11-18 DIAGNOSIS — Z00.00 ANNUAL PHYSICAL EXAM: ICD-10-CM

## 2024-11-18 DIAGNOSIS — Z00.00 ANNUAL PHYSICAL EXAM: Primary | ICD-10-CM

## 2024-11-18 LAB
25(OH)D3 SERPL-MCNC: 36 NG/ML (ref 30–100)
ALBUMIN SERPL BCP-MCNC: 4.4 G/DL (ref 3.4–5)
ALP SERPL-CCNC: 54 U/L (ref 33–110)
ALT SERPL W P-5'-P-CCNC: 12 U/L (ref 7–45)
ANION GAP SERPL CALC-SCNC: 11 MMOL/L (ref 10–20)
AST SERPL W P-5'-P-CCNC: 15 U/L (ref 9–39)
BILIRUB SERPL-MCNC: 1 MG/DL (ref 0–1.2)
BUN SERPL-MCNC: 12 MG/DL (ref 6–23)
CALCIUM SERPL-MCNC: 9.1 MG/DL (ref 8.6–10.6)
CHLORIDE SERPL-SCNC: 104 MMOL/L (ref 98–107)
CHOLEST SERPL-MCNC: 185 MG/DL (ref 0–199)
CHOLESTEROL/HDL RATIO: 3.2
CO2 SERPL-SCNC: 32 MMOL/L (ref 21–32)
CREAT SERPL-MCNC: 0.64 MG/DL (ref 0.5–1.05)
EGFRCR SERPLBLD CKD-EPI 2021: >90 ML/MIN/1.73M*2
ERYTHROCYTE [DISTWIDTH] IN BLOOD BY AUTOMATED COUNT: 12.1 % (ref 11.5–14.5)
EST. AVERAGE GLUCOSE BLD GHB EST-MCNC: 103 MG/DL
GLUCOSE SERPL-MCNC: 85 MG/DL (ref 74–99)
HBA1C MFR BLD: 5.2 %
HCT VFR BLD AUTO: 40.5 % (ref 36–46)
HDLC SERPL-MCNC: 57.6 MG/DL
HGB BLD-MCNC: 13.4 G/DL (ref 12–16)
LDLC SERPL CALC-MCNC: 106 MG/DL
MCH RBC QN AUTO: 30.6 PG (ref 26–34)
MCHC RBC AUTO-ENTMCNC: 33.1 G/DL (ref 32–36)
MCV RBC AUTO: 93 FL (ref 80–100)
NON HDL CHOLESTEROL: 127 MG/DL (ref 0–149)
NRBC BLD-RTO: 0 /100 WBCS (ref 0–0)
PLATELET # BLD AUTO: 237 X10*3/UL (ref 150–450)
POTASSIUM SERPL-SCNC: 4.7 MMOL/L (ref 3.5–5.3)
PROT SERPL-MCNC: 7 G/DL (ref 6.4–8.2)
RBC # BLD AUTO: 4.38 X10*6/UL (ref 4–5.2)
SODIUM SERPL-SCNC: 142 MMOL/L (ref 136–145)
TRIGL SERPL-MCNC: 109 MG/DL (ref 0–149)
TSH SERPL-ACNC: 1.61 MIU/L (ref 0.44–3.98)
VLDL: 22 MG/DL (ref 0–40)
WBC # BLD AUTO: 4.5 X10*3/UL (ref 4.4–11.3)

## 2024-11-18 PROCEDURE — 36415 COLL VENOUS BLD VENIPUNCTURE: CPT

## 2024-11-18 PROCEDURE — 84443 ASSAY THYROID STIM HORMONE: CPT

## 2024-11-18 PROCEDURE — 80061 LIPID PANEL: CPT

## 2024-11-18 PROCEDURE — 83036 HEMOGLOBIN GLYCOSYLATED A1C: CPT

## 2024-11-18 PROCEDURE — 85027 COMPLETE CBC AUTOMATED: CPT

## 2024-11-18 PROCEDURE — 99396 PREV VISIT EST AGE 40-64: CPT | Performed by: INTERNAL MEDICINE

## 2024-11-18 PROCEDURE — 1036F TOBACCO NON-USER: CPT | Performed by: INTERNAL MEDICINE

## 2024-11-18 PROCEDURE — 93000 ELECTROCARDIOGRAM COMPLETE: CPT | Performed by: INTERNAL MEDICINE

## 2024-11-18 PROCEDURE — 82306 VITAMIN D 25 HYDROXY: CPT

## 2024-11-18 PROCEDURE — 80053 COMPREHEN METABOLIC PANEL: CPT

## 2024-11-18 PROCEDURE — 3008F BODY MASS INDEX DOCD: CPT | Performed by: INTERNAL MEDICINE

## 2024-11-18 RX ORDER — ROSUVASTATIN CALCIUM 5 MG/1
5 TABLET, COATED ORAL DAILY
Qty: 90 TABLET | Refills: 3 | Status: SHIPPED | OUTPATIENT
Start: 2024-11-18

## 2024-11-18 RX ORDER — CITALOPRAM 40 MG/1
40 TABLET, FILM COATED ORAL
Qty: 90 TABLET | Refills: 3 | Status: SHIPPED | OUTPATIENT
Start: 2024-11-18

## 2024-11-18 NOTE — PROGRESS NOTES
"Subjective   Patient ID: Janette Mills is a 59 y.o. female who presents for the following  PHYSICAL 11/18/24  Assessment/Plan    Cologuard,  8/28/2023 negative      Anxiety: Refill citalopram     CBC, CMP, lipid panel, a1c, tsh, vitamin d     follow up with gyn, Dr Ware     mammogram ordered through Dr Chaz Pal 8/28/23 negative     HPI  Female obstructive sleep apnea comes today for physical exam      anxiety controlled on citalopram. she says that her  causes her a lot of anxiety as he has a significant drinking problem. patient's  continues to sleep and drink ETOH. She did leave her house and  as of December 26, 2023 and lives in her own apartment . She is now  now. She is doing better.      denies smoking  occasional alcohol   denies illegal drugs     denies any family medical problems      surgical history: umbilical hernia 9/2023 Dr Sherman     patient goes to GYN and gets regular gyn and mammogram orders filled.      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        Visit Vitals  /70 (BP Location: Right arm, Patient Position: Sitting, BP Cuff Size: Adult)   Pulse 67   Ht 1.626 m (5' 4\")   Wt 79.8 kg (176 lb)   SpO2 98%   BMI 30.21 kg/m²   Smoking Status Never   BSA 1.9 m²     PHYSICAL EXAM   General appearance: Alert and in no acute distress. speech is clear and coherent  HEENT: Sclera and conjunctiva white, EOMI, uvela midline, no mouth lesions. PERRLA,  nasal turbinates are not swollen without exudate. TM's Wagoner with cone of light, external ear canal with scant cerumen. No head trauma  Neck: no carotid bruits or thyromegaly. no lymphadenopathy   Respiratory : No respiratory distress, normal respiratory rhythm and effort. Clear bilateral breath sounds. No wheezing or rhonchi.   Cardiovascular: heart rate regular, S1, S2. no murmurs. no Lower extremity edema  Skin inspection: Normal skin color and pigmentation, normal skin turgor and no " visible rash, induration, or cellulitis  MSK: 5/5 strength upper and lower extremities without gait abnormalities. no loss of muscle mass   Neuro: 2-12 CN grossly intact.  no slurred speech. no lateralizing deficit  Psychiatric Orientation: Oriented to person, place, and time. no depression, homicidal or suicidal thoughts, normal affect  Abdomen: soft, none tender, none distended. no organomegaly      REVIEW OF SYSTEMS     Constitutional: not feeling tired and no fever, chills or sweats. Denies weight loss    HEENT: no earache and no sore throat. no blurred vision and or double vision. no headache  Cardiovascular: no exertional chest pain, no palpitations, no lower extremity edema and no intermittent leg claudication.   Lungs: Denies shortness of breath, exertional dyspnea, wheezing  Gastrointestinal: no change in bowel habits, no diarrhea, no nausea, no vomiting and no abdominal pain. Denies Melena, brbpr or dark stool  Musculoskeletal: no myalgias, no muscle weakness and no limb swelling.   Skin: no rashes, no change in skin color and pigmentation and no skin lumps.   Neurological: no headaches, no seizures, no numbness, no lateralizing deficits and no fainting.   Psychiatric: no depression and no anxiety.   Urine: denies polyuria, hematuria, dysuria   Endocrine: no cold intolerance, no heat intolerance    No Known Allergies    Current Outpatient Medications   Medication Sig Dispense Refill    citalopram (CeleXA) 40 mg tablet Take 1 tablet (40 mg) by mouth once daily. 90 tablet 3    rosuvastatin (Crestor) 5 mg tablet TAKE 1 TABLET BY MOUTH EVERY DAY 90 tablet 3     No current facility-administered medications for this visit.       Objective     No visits with results within 4 Month(s) from this visit.   Latest known visit with results is:   Lab on 10/18/2023   Component Date Value Ref Range Status    WBC 10/18/2023 5.0  4.4 - 11.3 x10*3/uL Final    nRBC 10/18/2023 0.0  0.0 - 0.0 /100 WBCs Final    RBC 10/18/2023  4.56  4.00 - 5.20 x10*6/uL Final    Hemoglobin 10/18/2023 14.0  12.0 - 16.0 g/dL Final    Hematocrit 10/18/2023 44.2  36.0 - 46.0 % Final    MCV 10/18/2023 97  80 - 100 fL Final    MCH 10/18/2023 30.7  26.0 - 34.0 pg Final    MCHC 10/18/2023 31.7 (L)  32.0 - 36.0 g/dL Final    RDW 10/18/2023 12.7  11.5 - 14.5 % Final    Platelets 10/18/2023 295  150 - 450 x10*3/uL Final    MPV 10/18/2023 10.4  7.5 - 11.5 fL Final    Glucose 10/18/2023 90  74 - 99 mg/dL Final    Sodium 10/18/2023 142  136 - 145 mmol/L Final    Potassium 10/18/2023 4.1  3.5 - 5.3 mmol/L Final    Chloride 10/18/2023 102  98 - 107 mmol/L Final    Bicarbonate 10/18/2023 30  21 - 32 mmol/L Final    Anion Gap 10/18/2023 14  10 - 20 mmol/L Final    Urea Nitrogen 10/18/2023 8  6 - 23 mg/dL Final    Creatinine 10/18/2023 0.63  0.50 - 1.05 mg/dL Final    eGFR 10/18/2023 >90  >60 mL/min/1.73m*2 Final    Calcium 10/18/2023 9.8  8.6 - 10.6 mg/dL Final    Albumin 10/18/2023 4.5  3.4 - 5.0 g/dL Final    Alkaline Phosphatase 10/18/2023 69  33 - 110 U/L Final    Total Protein 10/18/2023 7.1  6.4 - 8.2 g/dL Final    AST 10/18/2023 14  9 - 39 U/L Final    Bilirubin, Total 10/18/2023 0.9  0.0 - 1.2 mg/dL Final    ALT 10/18/2023 12  7 - 45 U/L Final    Hemoglobin A1C 10/18/2023 5.4  see below % Final    Estimated Average Glucose 10/18/2023 108  Not Established mg/dL Final    Cholesterol 10/18/2023 253 (H)  0 - 199 mg/dL Final    HDL-Cholesterol 10/18/2023 60.0  mg/dL Final    Cholesterol/HDL Ratio 10/18/2023 4.2   Final    LDL Calculated 10/18/2023 145 (H)  <=99 mg/dL Final    VLDL 10/18/2023 48 (H)  0 - 40 mg/dL Final    Triglycerides 10/18/2023 239 (H)  0 - 149 mg/dL Final    Non HDL Cholesterol 10/18/2023 193 (H)  0 - 149 mg/dL Final    Thyroid Stimulating Hormone 10/18/2023 1.44  0.44 - 3.98 mIU/L Final    Vitamin D, 25-Hydroxy, Total 10/18/2023 35  30 - 100 ng/mL Final       Radiology: Reviewed imaging in powerchart.  No results found.    No family history on  file.  Social History     Socioeconomic History    Marital status: Unknown   Tobacco Use    Smoking status: Never    Smokeless tobacco: Never   Substance and Sexual Activity    Alcohol use: Yes     Comment: occassionally    Drug use: Never     Past Medical History:   Diagnosis Date    Personal history of other diseases of the nervous system and sense organs     History of earache     Past Surgical History:   Procedure Laterality Date    CHOLECYSTECTOMY  02/07/2018    Cholecystectomy       Charting was completed using voice recognition technology and may include unintended errors.

## 2025-05-15 ENCOUNTER — TELEPHONE (OUTPATIENT)
Dept: PRIMARY CARE | Facility: CLINIC | Age: 60
End: 2025-05-15
Payer: COMMERCIAL

## 2025-05-15 NOTE — TELEPHONE ENCOUNTER
Electrocardiograph has to be entered with a preventive code for it to be covered.      Claim number is:  [id number]

## 2025-10-22 ENCOUNTER — APPOINTMENT (OUTPATIENT)
Dept: PRIMARY CARE | Facility: CLINIC | Age: 60
End: 2025-10-22
Payer: COMMERCIAL